# Patient Record
Sex: FEMALE | Race: WHITE | URBAN - METROPOLITAN AREA
[De-identification: names, ages, dates, MRNs, and addresses within clinical notes are randomized per-mention and may not be internally consistent; named-entity substitution may affect disease eponyms.]

---

## 2018-05-28 ENCOUNTER — EMERGENCY (EMERGENCY)
Facility: HOSPITAL | Age: 28
LOS: 1 days | Discharge: HOME | End: 2018-05-28
Admitting: EMERGENCY MEDICINE
Payer: COMMERCIAL

## 2018-05-28 VITALS
HEART RATE: 94 BPM | WEIGHT: 110.01 LBS | OXYGEN SATURATION: 99 % | HEIGHT: 62 IN | TEMPERATURE: 98 F | RESPIRATION RATE: 18 BRPM | DIASTOLIC BLOOD PRESSURE: 90 MMHG | SYSTOLIC BLOOD PRESSURE: 136 MMHG

## 2018-05-28 DIAGNOSIS — Z79.899 OTHER LONG TERM (CURRENT) DRUG THERAPY: ICD-10-CM

## 2018-05-28 DIAGNOSIS — M79.81 NONTRAUMATIC HEMATOMA OF SOFT TISSUE: ICD-10-CM

## 2018-05-28 DIAGNOSIS — M79.671 PAIN IN RIGHT FOOT: ICD-10-CM

## 2018-05-28 PROCEDURE — 99283 EMERGENCY DEPT VISIT LOW MDM: CPT

## 2018-05-28 PROCEDURE — 73630 X-RAY EXAM OF FOOT: CPT | Mod: 26,RT

## 2018-05-28 PROCEDURE — 96372 THER/PROPH/DIAG INJ SC/IM: CPT

## 2018-05-28 PROCEDURE — 99283 EMERGENCY DEPT VISIT LOW MDM: CPT | Mod: 25

## 2018-05-28 PROCEDURE — 73630 X-RAY EXAM OF FOOT: CPT

## 2018-05-28 RX ORDER — KETOROLAC TROMETHAMINE 30 MG/ML
30 SYRINGE (ML) INJECTION ONCE
Qty: 0 | Refills: 0 | Status: DISCONTINUED | OUTPATIENT
Start: 2018-05-28 | End: 2018-05-28

## 2018-05-28 RX ORDER — IBUPROFEN 200 MG
1 TABLET ORAL
Qty: 21 | Refills: 0 | OUTPATIENT
Start: 2018-05-28 | End: 2018-06-03

## 2018-05-28 RX ORDER — NORETHINDRONE AND ETHINYL ESTRADIOL 0.4-0.035
1 KIT ORAL
Qty: 0 | Refills: 0 | COMMUNITY

## 2018-05-28 RX ADMIN — Medication 30 MILLIGRAM(S): at 10:24

## 2018-05-28 RX ADMIN — Medication 30 MILLIGRAM(S): at 10:39

## 2018-05-28 NOTE — ED PROVIDER NOTE - MEDICAL DECISION MAKING DETAILS
27 y/o female with atraumatic right foot pain. PE: mild ecchymosis and mild ttp on top of foot 3/4th metatarsal. Xray negative. Pain improved post toradol. Do not suspect infection. Will continue conservative methods to include heat, foot inserts, rest, elevation, supportive shoe wear. Pt has a fu appointment with podiatry this week.

## 2018-05-28 NOTE — ED PROVIDER NOTE - OBJECTIVE STATEMENT
29 y/o female with no PMHx is present with right foot pain x3 months. Pt reports pain is located on the top of her right foot. The pain has increased and she noticed a little bruise. She denies the following: injury, swelling, numbness/tingling. Pt states she took aleve without improvement of pain. Pt states she has an appointment with her podiatrist this week.

## 2018-05-28 NOTE — ED PROVIDER NOTE - PHYSICAL EXAMINATION
Right foot: no swelling, mild ecchymosis, mild ttp on top of foot in between 3/4th metatarsal, sensation and motor function intact. Good ROM

## 2018-05-28 NOTE — ED ADULT NURSE NOTE - OBJECTIVE STATEMENT
Pt w no PMH presents to ER w c/o R foot pain, states " I think I have a sprain or a fracture". R foot warm to touch, +2 DP, pt able to wiggle her toes. No redness, swelling, pt denies numbness or tingling.

## 2019-01-28 NOTE — ED ADULT NURSE NOTE - FALLEN IN THE PAST
January 28, 2019      RUDY Botello Jr., MD  [address] [facility] [id number]           [facility]  [address] [place] [address]  [place] [address]  Phone: [phone]  Fax: [phone]          Patient: Janna Sheffield   MR Number: [id number]   YOB: 1960   Date of Visit: 1/28/2019       Dear Dr. RUDY Botello Jr.:    Thank you for referring Janna Sheffield to me for evaluation. Attached you will find relevant portions of my assessment and plan of care.    If you have questions, please do not hesitate to call me. I look forward to following Janna Sheffield along with you.    Sincerely,    Demetria Bhat, NP    Enclosure  CC:  No Recipients    If you would like to receive this communication electronically, please contact [email or web address] or [phone] to request more information on [employer] Link access.    For providers and/or their staff who would like to refer a patient to [facility], please contact us through our one-stop-shop provider referral line, [facility], at [phone].    If you feel you have received this communication in error or would no longer like to receive these types of communications, please e-mail [email or web address]         
no

## 2023-07-15 NOTE — ED ADULT TRIAGE NOTE - TEMPERATURE IN CELSIUS (DEGREES C)
Department of Obstetrics and Gynecology   Obstetrics History and Physical        CHIEF COMPLAINT: No chief complaint on file. HISTORY OF PRESENT ILLNESS:      The patient is a 15 y.o. female at Unknown. OB History          1    Para        Term                AB        Living             SAB        IAB        Ectopic        Molar        Multiple        Live Births                Patient presents with a chief complaint as above and is being admitted for postpartum care after home delivery, no prenatal care. Estimated Due Date: Estimated Date of Delivery: None noted. PRENATAL CARE:    Complicated by: no prenatal care, teen pregnancy, high blood readings    PAST OB HISTORY  OB History          1    Para        Term                AB        Living             SAB        IAB        Ectopic        Molar        Multiple        Live Births                    Past Medical History:    No past medical history on file. Past Surgical History:    No past surgical history on file. Allergies:  Patient has no known allergies.   Social History:    Social History     Socioeconomic History    Marital status: Single     Spouse name: Not on file    Number of children: Not on file    Years of education: Not on file    Highest education level: Not on file   Occupational History    Not on file   Tobacco Use    Smoking status: Not on file    Smokeless tobacco: Not on file   Substance and Sexual Activity    Alcohol use: Not on file    Drug use: Not on file    Sexual activity: Not on file   Other Topics Concern    Not on file   Social History Narrative    Not on file     Social Determinants of Health     Financial Resource Strain: Not on file   Food Insecurity: Not on file   Transportation Needs: Not on file   Physical Activity: Not on file   Stress: Not on file   Social Connections: Not on file   Intimate Partner Violence: Not on file   Housing Stability: Not on file     Family History:   No family
36.9